# Patient Record
Sex: FEMALE | Race: OTHER
[De-identification: names, ages, dates, MRNs, and addresses within clinical notes are randomized per-mention and may not be internally consistent; named-entity substitution may affect disease eponyms.]

---

## 2018-10-12 ENCOUNTER — HOSPITAL ENCOUNTER (EMERGENCY)
Dept: HOSPITAL 36 - ER | Age: 23
Discharge: HOME | End: 2018-10-12
Payer: MEDICAID
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
## 2018-10-12 DIAGNOSIS — S80.929A: Primary | ICD-10-CM
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
## 2018-10-12 LAB
AMPHET UR-MCNC: NEGATIVE NG/ML
APPEARANCE UR: CLEAR
BACTERIA #/AREA URNS HPF: (no result) /HPF
BARBITURATES UR-MCNC: NEGATIVE UG/ML
BENZODIAZEPINES PNL UR: NEGATIVE
BILIRUB UR-MCNC: NEGATIVE MG/DL
CANNABINOIDS SERPL QL CFM: NEGATIVE
COCAINE METAB.OTHER UR-MCNC: NEGATIVE NG/ML
COLOR UR: YELLOW
EPI CELLS URNS QL MICRO: (no result) /LPF
GLUCOSE UR STRIP-MCNC: NEGATIVE MG/DL
KETONES UR STRIP-MCNC: NEGATIVE MG/DL
LEUKOCYTE ESTERASE UR-ACNC: (no result)
METHADONE UR CFM-MCNC: NEGATIVE NG/ML
METHAMPHET UR QL: NEGATIVE
MICRO URNS: YES
NITRITE UR QL STRIP: NEGATIVE
OPIATES UR QL: NEGATIVE
PCP UR-MCNC: NEGATIVE UG/L
PH UR STRIP: 6 [PH] (ref 4.6–8)
PROT UR STRIP-MCNC: (no result) MG/DL
RBC # UR STRIP: NEGATIVE /UL
RBC #/AREA URNS HPF: (no result) /HPF (ref 0–5)
SP GR UR STRIP: 1.02 (ref 1–1.03)
TRICYCLICS UR QL: NEGATIVE
URINALYSIS COMPLETE PNL UR: (no result)
UROBILINOGEN UR STRIP-ACNC: 0.2 E.U./DL (ref 0.2–1)
WBC #/AREA URNS HPF: (no result) /HPF (ref 0–5)

## 2018-10-12 PROCEDURE — 99285 EMERGENCY DEPT VISIT HI MDM: CPT

## 2018-10-12 PROCEDURE — 90715 TDAP VACCINE 7 YRS/> IM: CPT

## 2018-10-12 PROCEDURE — 73110 X-RAY EXAM OF WRIST: CPT

## 2018-10-12 PROCEDURE — 87086 URINE CULTURE/COLONY COUNT: CPT

## 2018-10-12 PROCEDURE — 72110 X-RAY EXAM L-2 SPINE 4/>VWS: CPT

## 2018-10-12 PROCEDURE — 81001 URINALYSIS AUTO W/SCOPE: CPT

## 2018-10-12 PROCEDURE — 73130 X-RAY EXAM OF HAND: CPT

## 2018-10-12 PROCEDURE — 81025 URINE PREGNANCY TEST: CPT

## 2018-10-12 PROCEDURE — 96372 THER/PROPH/DIAG INJ SC/IM: CPT

## 2018-10-12 PROCEDURE — 73590 X-RAY EXAM OF LOWER LEG: CPT

## 2018-10-12 PROCEDURE — 80320 DRUG SCREEN QUANTALCOHOLS: CPT

## 2018-10-12 PROCEDURE — 80307 DRUG TEST PRSMV CHEM ANLYZR: CPT

## 2018-10-12 PROCEDURE — 36415 COLL VENOUS BLD VENIPUNCTURE: CPT

## 2018-10-12 NOTE — DIAGNOSTIC IMAGING REPORT
Left tibia/fibula (2 views)



HISTORY: Pain



No acute bony abnormalities.  No fractures.



IMPRESSION: No acute bony abnormalities

## 2018-10-12 NOTE — DIAGNOSTIC IMAGING REPORT
Left wrist (3 views)



HISTORY: Pain



No acute bony abnormalities.  No fractures.  Joint spaces appear normal.



IMPRESSION: No acute abnormalities



In the presence of recent trauma and persistent symptoms, a repeat

radiograph in 5-7 days may be helpful for detection of a subtle or

occult fracture.
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
## 2018-10-12 NOTE — DIAGNOSTIC IMAGING REPORT
Left hand (3 views)



HISTORY: Pain



No focal lesions.  No fractures.  Joint spaces appear normal.



IMPRESSION: No acute abnormalities



In the presence of recent trauma and persistent symptoms, a repeat

radiograph in 5-7 days may be helpful for detection of a subtle or

occult fracture.